# Patient Record
Sex: FEMALE | Race: WHITE | Employment: OTHER | URBAN - METROPOLITAN AREA
[De-identification: names, ages, dates, MRNs, and addresses within clinical notes are randomized per-mention and may not be internally consistent; named-entity substitution may affect disease eponyms.]

---

## 2021-06-10 ENCOUNTER — HOSPITAL ENCOUNTER (OUTPATIENT)
Dept: PREADMISSION TESTING | Age: 76
Discharge: HOME | End: 2021-06-10
Payer: MEDICARE

## 2021-06-10 ENCOUNTER — TRANSCRIBE ORDER (OUTPATIENT)
Dept: REGISTRATION | Age: 76
End: 2021-06-10

## 2021-06-10 DIAGNOSIS — M12.9 ACUTE ARTHROPATHY: ICD-10-CM

## 2021-06-10 DIAGNOSIS — M12.9 ACUTE ARTHROPATHY: Primary | ICD-10-CM

## 2021-06-10 LAB
ANION GAP SERPL CALC-SCNC: 4 MMOL/L (ref 3–18)
ATRIAL RATE: 104 BPM
BASOPHILS # BLD: 0 K/UL (ref 0–0.1)
BASOPHILS NFR BLD: 0 % (ref 0–2)
BUN SERPL-MCNC: 18 MG/DL (ref 7–18)
BUN/CREAT SERPL: 23 (ref 12–20)
CALCIUM SERPL-MCNC: 9.3 MG/DL (ref 8.5–10.1)
CALCULATED P AXIS, ECG09: 69 DEGREES
CALCULATED R AXIS, ECG10: 59 DEGREES
CALCULATED T AXIS, ECG11: 67 DEGREES
CHLORIDE SERPL-SCNC: 108 MMOL/L (ref 100–111)
CO2 SERPL-SCNC: 29 MMOL/L (ref 21–32)
CREAT SERPL-MCNC: 0.77 MG/DL (ref 0.6–1.3)
DIAGNOSIS, 93000: NORMAL
DIFFERENTIAL METHOD BLD: ABNORMAL
EOSINOPHIL # BLD: 0.2 K/UL (ref 0–0.4)
EOSINOPHIL NFR BLD: 4 % (ref 0–5)
ERYTHROCYTE [DISTWIDTH] IN BLOOD BY AUTOMATED COUNT: 14.7 % (ref 11.6–14.5)
GLUCOSE SERPL-MCNC: 104 MG/DL (ref 74–99)
HCT VFR BLD AUTO: 39.3 % (ref 35–45)
HGB BLD-MCNC: 12.8 G/DL (ref 12–16)
LYMPHOCYTES # BLD: 1.4 K/UL (ref 0.9–3.6)
LYMPHOCYTES NFR BLD: 20 % (ref 21–52)
MCH RBC QN AUTO: 30.7 PG (ref 24–34)
MCHC RBC AUTO-ENTMCNC: 32.6 G/DL (ref 31–37)
MCV RBC AUTO: 94.2 FL (ref 74–97)
MONOCYTES # BLD: 0.6 K/UL (ref 0.05–1.2)
MONOCYTES NFR BLD: 9 % (ref 3–10)
NEUTS SEG # BLD: 4.6 K/UL (ref 1.8–8)
NEUTS SEG NFR BLD: 67 % (ref 40–73)
P-R INTERVAL, ECG05: 160 MS
PLATELET # BLD AUTO: 235 K/UL (ref 135–420)
PMV BLD AUTO: 9.3 FL (ref 9.2–11.8)
POTASSIUM SERPL-SCNC: 4 MMOL/L (ref 3.5–5.5)
Q-T INTERVAL, ECG07: 344 MS
QRS DURATION, ECG06: 80 MS
QTC CALCULATION (BEZET), ECG08: 452 MS
RBC # BLD AUTO: 4.17 M/UL (ref 4.2–5.3)
SODIUM SERPL-SCNC: 141 MMOL/L (ref 136–145)
VENTRICULAR RATE, ECG03: 104 BPM
WBC # BLD AUTO: 6.9 K/UL (ref 4.6–13.2)

## 2021-06-10 PROCEDURE — 36415 COLL VENOUS BLD VENIPUNCTURE: CPT

## 2021-06-10 PROCEDURE — 93005 ELECTROCARDIOGRAM TRACING: CPT

## 2021-06-10 PROCEDURE — 80048 BASIC METABOLIC PNL TOTAL CA: CPT

## 2021-06-10 PROCEDURE — 85025 COMPLETE CBC W/AUTO DIFF WBC: CPT

## 2021-06-12 LAB
BACTERIA SPEC CULT: NORMAL
BACTERIA SPEC CULT: NORMAL
SERVICE CMNT-IMP: NORMAL

## 2021-06-21 PROBLEM — M51.26 HNP (HERNIATED NUCLEUS PULPOSUS), LUMBAR: Status: ACTIVE | Noted: 2021-06-21

## 2021-06-21 PROBLEM — M51.36 DDD (DEGENERATIVE DISC DISEASE), LUMBAR: Status: ACTIVE | Noted: 2021-06-21

## 2021-06-21 PROBLEM — M48.061 LUMBAR SPINAL STENOSIS: Status: ACTIVE | Noted: 2021-06-21

## 2021-06-21 RX ORDER — SODIUM CHLORIDE 0.9 % (FLUSH) 0.9 %
5-40 SYRINGE (ML) INJECTION EVERY 8 HOURS
Status: CANCELLED | OUTPATIENT
Start: 2021-06-21

## 2021-06-21 RX ORDER — SODIUM CHLORIDE 0.9 % (FLUSH) 0.9 %
5-40 SYRINGE (ML) INJECTION AS NEEDED
Status: CANCELLED | OUTPATIENT
Start: 2021-06-21

## 2021-06-24 NOTE — H&P
Patient Name:  Demetrius Howell   YOB: 1945    Chief Complaint:  Left-sided hip pain and leg pain. History of Chief Complaint:  Ms. Wayne Palencia is a 64-year-old female who is being seen for left-sided hip pain and leg pain. She has had pain for the past two weeks in the left-sided lower back. She had a fall back in December, but she got better after that. She has had pain in the buttocks and hip. Getting up from a seated position causes pain. Standing for any length of time causes pain. Going up and down stairs causes pain. There is no numbness, no weakness, and no bowel or bladder dysfunction. She has not had this problem before. She has been using Tylenol. She cannot take anti-inflammatory medications. She says the epidural steroid injection seemed to help with her back and left lower extremity pain. She got about 45% improvement for ten days. She is starting to ache all over again. The pain is returning and is almost back to the original intensity. She rates her pain as an 8/10, and it has been for the past two weeks. Past Medical/Surgical History:    Disease/Disorder Date Side Surgery Date Side Comment   GERD         Gout         High cholesterol         Hypertension         Kidney disease         Thyroid disease           Allergies:    Ingredient Reaction Medication Name Comment   SULFA (SULFONAMIDE ANTIBIOTICS)  Codeine        Current Medications:    Medication Directions   allopurinol 100 mg tablet    amlodipine 5 mg tablet    aspirin 81 mg tablet,delayed release    atorvastatin 40 mg tablet    escitalopram 10 mg tablet    ezetimibe 10 mg tablet    levothyroxine 112 mcg tablet    loratadine 10 mg tablet    zolpidem 10 mg tablet      Social History:      SMOKING  Status Tobacco Type Units Per Day Yrs Used   Former smoker Cigarette       ALCOHOL  There is a history of alcohol use. Type: Whiskey. 1 drink consumed rarely.     Family History:    Disease Detail Family Member Age Cause of Death [FreeTextEntry1] : Patient is having migraine equivalents with tingling and mild HA.  \par \par Would increase elavil to 25mg QHS as he tolerates the 10mg dose, and have encouraged him to stay on it for at least two months to know if it is actually helping.   Comments   Cancer Father  N    Heart disease Father  N      Review of Systems:    GENERAL:  Patient has no signs of fever, chills or weight change. HEAD/ENTM:  Patient has no signs of headaches, dizziness, hearing loss, ringing in ears, sore throat, recent cold, double vision, blurred vision, itchy eyes, eye redness or eye discharge. NEUROLOGIC:  Patient has no signs of fainting, muscle weakness, numbness/tingling, loss of balance or seizure disorder. CARDIOVASCULAR:  Patient has no signs of chest pain, palpitations, rheumatic fever or heart murmur. RESPIRATORY:  Patient has no signs of chronic cough, wheezing, difficulty breathing, pain on breathing or shortness of breath. GASTROINTESTINAL:  Patient has no signs of nausea/vomiting, difficulty swallowing, gas/bloating, indigestion, abdominal pain, diarrhea, bloody stools or hemorrhoids. GENITOURINARY:  Patient has no signs of blood in the urine, painful urinating, burning sensation, bladder/kidney infection, frequent urinating or incontinence. MUSCULOSKELETAL:  Patient has no signs of fracture/dislocation, sprain/strain, tendonitis, joint stiffness, joint pain, rheumatoid disease, gout or swelling in feet. INTEGUMENTARY:  Patient has no signs of rash/itching, psoriasis, Raynaud's phenomenon or varicose veins. EMOTIONAL:  Patient has no signs of anxiety, depression, bipolar disorder, memory loss or change in mood. Vitals:  Date BP Pulse Temp (F) Resp. (per min.) Height (Total in.) Weight (lbs.) BMI   02/25/2021     65.00 170.00 28.29     Physical Examination:    General:  The patient appears healthy. Cardiovascular:  Arterial pulses are normal.  Skin:  The skin is normal appearing with no contusions or wounds noted. Heart- RRR  Lungs-CTA jacobo  Abdomen- +BS,soft,nontender  Musculoskeletal:  There is tenderness around the left PSIS and left greater trochanter. The thoracolumbar spine has normal kyphosis, a normal appearance, and no scoliosis.   There is full range of motion of the cervical, thoracic, and lumbar spine and of the hips, knees, and ankles. Straight leg raising and crossed straight leg raising tests are negative. Neurological:  There is no weakness in the thoracic, lumbar, or sacral spine or in the lower extremities or hips. Deep tendon reflexes are present and normal bilaterally. Ankle and knee jerks are normal with no clonus. Radiograph Examination:  AP, lateral, bilateral oblique, flexion and extension views of the lumbar spine were obtained and interpreted in the office 2/25/2021 and reveal 7 mm spondylolisthesis of L5 on S1 with mild degenerative disc disease. An AP view of the pelvis was obtained and interpreted in the office  and reveals no periosteal reaction, no medullary lesions, no osteopenia, well-aligned joint spaces, no chondrolysis, and no fractures. Review of her MRI scan of the lumbar spine  [facility] 4/1/2021 reveals degenerative disc disease, worse at L4-5 and L5-S1, with spondylolisthesis at L5-S1 and foraminal stenosis. Plan: Ms. Ernst Varela and NOEMY had a long discussion about the treatments for her lumbar spinal stenosis and spondylolisthesis including surgical intervention, the risks, and benefits as well as the different surgical approaches and decision making. We also discussed nonsurgical care for this condition including medications, injections, physical therapy, rehabilitation, activity modification, and brace utilization. At this point, she would like to proceed with operative intervention. We will plan for L4 to S1 decompression and fusion as an outpatient. The risks versus the benefits as well as the alternatives were fully explained to the patient.   Risks include, but are not limited to, paralysis, death, heart attack, stroke, pulmonary embolism, deep vein thrombosis, infection, failure to relieve pain, increase in back or leg pain, reherniation of disc material, need for revision surgery, scarring, spinal fluid leak, bowel or bladder dysfunction, disease transmission, chronic graft site pain, instrumentation failure, pseudarthrosis, and the need for chronic ambulatory assist devices. The patient states full understanding of the risks and benefits and wishes to proceed.

## 2021-06-29 ENCOUNTER — ANESTHESIA EVENT (OUTPATIENT)
Dept: SURGERY | Age: 76
DRG: 460 | End: 2021-06-29
Payer: MEDICARE

## 2021-06-30 ENCOUNTER — HOSPITAL ENCOUNTER (INPATIENT)
Age: 76
LOS: 1 days | Discharge: HOME | DRG: 460 | End: 2021-07-01
Attending: ORTHOPAEDIC SURGERY | Admitting: ORTHOPAEDIC SURGERY
Payer: MEDICARE

## 2021-06-30 ENCOUNTER — APPOINTMENT (OUTPATIENT)
Dept: GENERAL RADIOLOGY | Age: 76
DRG: 460 | End: 2021-06-30
Attending: ORTHOPAEDIC SURGERY
Payer: MEDICARE

## 2021-06-30 ENCOUNTER — ANESTHESIA (OUTPATIENT)
Dept: SURGERY | Age: 76
DRG: 460 | End: 2021-06-30
Payer: MEDICARE

## 2021-06-30 DIAGNOSIS — M48.062 SPINAL STENOSIS OF LUMBAR REGION WITH NEUROGENIC CLAUDICATION: Primary | ICD-10-CM

## 2021-06-30 PROBLEM — M48.061 LUMBAR STENOSIS: Status: ACTIVE | Noted: 2021-06-30

## 2021-06-30 LAB
ABO + RH BLD: NORMAL
BLOOD GROUP ANTIBODIES SERPL: NORMAL
SPECIMEN EXP DATE BLD: NORMAL

## 2021-06-30 PROCEDURE — 76060000035 HC ANESTHESIA 2 TO 2.5 HR: Performed by: ORTHOPAEDIC SURGERY

## 2021-06-30 PROCEDURE — 77030013079 HC BLNKT BAIR HGGR 3M -A: Performed by: STUDENT IN AN ORGANIZED HEALTH CARE EDUCATION/TRAINING PROGRAM

## 2021-06-30 PROCEDURE — 76210000016 HC OR PH I REC 1 TO 1.5 HR: Performed by: ORTHOPAEDIC SURGERY

## 2021-06-30 PROCEDURE — C1713 ANCHOR/SCREW BN/BN,TIS/BN: HCPCS | Performed by: ORTHOPAEDIC SURGERY

## 2021-06-30 PROCEDURE — 77030020407 HC IV BLD WRMR ST 3M -A: Performed by: STUDENT IN AN ORGANIZED HEALTH CARE EDUCATION/TRAINING PROGRAM

## 2021-06-30 PROCEDURE — 74011250636 HC RX REV CODE- 250/636: Performed by: REGISTERED NURSE

## 2021-06-30 PROCEDURE — 0SG3071 FUSION OF LUMBOSACRAL JOINT WITH AUTOLOGOUS TISSUE SUBSTITUTE, POSTERIOR APPROACH, POSTERIOR COLUMN, OPEN APPROACH: ICD-10-PCS | Performed by: ORTHOPAEDIC SURGERY

## 2021-06-30 PROCEDURE — 77030019908 HC STETH ESOPH SIMS -A: Performed by: STUDENT IN AN ORGANIZED HEALTH CARE EDUCATION/TRAINING PROGRAM

## 2021-06-30 PROCEDURE — 77030003028 HC SUT VCRL J&J -A: Performed by: ORTHOPAEDIC SURGERY

## 2021-06-30 PROCEDURE — 74011000250 HC RX REV CODE- 250: Performed by: REGISTERED NURSE

## 2021-06-30 PROCEDURE — 74011000250 HC RX REV CODE- 250: Performed by: PHYSICIAN ASSISTANT

## 2021-06-30 PROCEDURE — 74011250636 HC RX REV CODE- 250/636: Performed by: NURSE ANESTHETIST, CERTIFIED REGISTERED

## 2021-06-30 PROCEDURE — 77030020269 HC MISC IMPL: Performed by: ORTHOPAEDIC SURGERY

## 2021-06-30 PROCEDURE — 77030003029 HC SUT VCRL J&J -B: Performed by: ORTHOPAEDIC SURGERY

## 2021-06-30 PROCEDURE — 0SG0071 FUSION OF LUMBAR VERTEBRAL JOINT WITH AUTOLOGOUS TISSUE SUBSTITUTE, POSTERIOR APPROACH, POSTERIOR COLUMN, OPEN APPROACH: ICD-10-PCS | Performed by: ORTHOPAEDIC SURGERY

## 2021-06-30 PROCEDURE — 74011250636 HC RX REV CODE- 250/636: Performed by: STUDENT IN AN ORGANIZED HEALTH CARE EDUCATION/TRAINING PROGRAM

## 2021-06-30 PROCEDURE — 77030004402 HC BUR NEUR STRY -C: Performed by: ORTHOPAEDIC SURGERY

## 2021-06-30 PROCEDURE — 01NB0ZZ RELEASE LUMBAR NERVE, OPEN APPROACH: ICD-10-PCS | Performed by: ORTHOPAEDIC SURGERY

## 2021-06-30 PROCEDURE — 86901 BLOOD TYPING SEROLOGIC RH(D): CPT

## 2021-06-30 PROCEDURE — 74011250637 HC RX REV CODE- 250/637: Performed by: PHYSICIAN ASSISTANT

## 2021-06-30 PROCEDURE — 77030008477 HC STYL SATN SLP COVD -A: Performed by: STUDENT IN AN ORGANIZED HEALTH CARE EDUCATION/TRAINING PROGRAM

## 2021-06-30 PROCEDURE — 77030013708 HC HNDPC SUC IRR PULS STRY –B: Performed by: ORTHOPAEDIC SURGERY

## 2021-06-30 PROCEDURE — 74011250636 HC RX REV CODE- 250/636: Performed by: ORTHOPAEDIC SURGERY

## 2021-06-30 PROCEDURE — 65270000029 HC RM PRIVATE

## 2021-06-30 PROCEDURE — 77030020782 HC GWN BAIR PAWS FLX 3M -B: Performed by: ORTHOPAEDIC SURGERY

## 2021-06-30 PROCEDURE — 77030028271 HC SRGFL HEMSTAT MTRX KT J&J -C: Performed by: ORTHOPAEDIC SURGERY

## 2021-06-30 PROCEDURE — 36415 COLL VENOUS BLD VENIPUNCTURE: CPT

## 2021-06-30 PROCEDURE — 74011250637 HC RX REV CODE- 250/637: Performed by: REGISTERED NURSE

## 2021-06-30 PROCEDURE — 77030008683 HC TU ET CUF COVD -A: Performed by: STUDENT IN AN ORGANIZED HEALTH CARE EDUCATION/TRAINING PROGRAM

## 2021-06-30 PROCEDURE — 74011000250 HC RX REV CODE- 250: Performed by: STUDENT IN AN ORGANIZED HEALTH CARE EDUCATION/TRAINING PROGRAM

## 2021-06-30 PROCEDURE — 77030006643: Performed by: STUDENT IN AN ORGANIZED HEALTH CARE EDUCATION/TRAINING PROGRAM

## 2021-06-30 PROCEDURE — 2709999900 HC NON-CHARGEABLE SUPPLY: Performed by: ORTHOPAEDIC SURGERY

## 2021-06-30 PROCEDURE — 76010000131 HC OR TIME 2 TO 2.5 HR: Performed by: ORTHOPAEDIC SURGERY

## 2021-06-30 PROCEDURE — 74011250636 HC RX REV CODE- 250/636: Performed by: PHYSICIAN ASSISTANT

## 2021-06-30 PROCEDURE — 77030040830 HC CATH URETH FOL MDII -A: Performed by: ORTHOPAEDIC SURGERY

## 2021-06-30 PROCEDURE — 77030008462 HC STPLR SKN PROX J&J -A: Performed by: ORTHOPAEDIC SURGERY

## 2021-06-30 PROCEDURE — 77030038552 HC DRN WND MDII -A: Performed by: ORTHOPAEDIC SURGERY

## 2021-06-30 PROCEDURE — 01NR0ZZ RELEASE SACRAL NERVE, OPEN APPROACH: ICD-10-PCS | Performed by: ORTHOPAEDIC SURGERY

## 2021-06-30 DEVICE — SET SCR SPNL L25MM DIA5.5MM TI FOR 5.5MM ROD EXPEDIUM: Type: IMPLANTABLE DEVICE | Site: SPINE LUMBAR | Status: FUNCTIONAL

## 2021-06-30 DEVICE — ROD SPNL L60MM DIA5.5MM TI PRELORDOSED EXPEDIUM: Type: IMPLANTABLE DEVICE | Site: SPINE LUMBAR | Status: FUNCTIONAL

## 2021-06-30 DEVICE — SCREW SPNL L45MM DIA7MM TI SGL INNR POLYAX FOR 5.5MM ROD: Type: IMPLANTABLE DEVICE | Site: SPINE LUMBAR | Status: FUNCTIONAL

## 2021-06-30 RX ORDER — HEPARIN SODIUM 10000 [USP'U]/ML
INJECTION, SOLUTION INTRAVENOUS; SUBCUTANEOUS AS NEEDED
Status: DISCONTINUED | OUTPATIENT
Start: 2021-06-30 | End: 2021-06-30 | Stop reason: HOSPADM

## 2021-06-30 RX ORDER — FENTANYL CITRATE 50 UG/ML
INJECTION, SOLUTION INTRAMUSCULAR; INTRAVENOUS AS NEEDED
Status: DISCONTINUED | OUTPATIENT
Start: 2021-06-30 | End: 2021-06-30 | Stop reason: HOSPADM

## 2021-06-30 RX ORDER — SODIUM CHLORIDE, SODIUM LACTATE, POTASSIUM CHLORIDE, CALCIUM CHLORIDE 600; 310; 30; 20 MG/100ML; MG/100ML; MG/100ML; MG/100ML
50 INJECTION, SOLUTION INTRAVENOUS CONTINUOUS
Status: DISCONTINUED | OUTPATIENT
Start: 2021-06-30 | End: 2021-06-30 | Stop reason: HOSPADM

## 2021-06-30 RX ORDER — DIPHENHYDRAMINE HYDROCHLORIDE 50 MG/ML
12.5 INJECTION, SOLUTION INTRAMUSCULAR; INTRAVENOUS
Status: DISCONTINUED | OUTPATIENT
Start: 2021-06-30 | End: 2021-06-30 | Stop reason: HOSPADM

## 2021-06-30 RX ORDER — HYDROCODONE BITARTRATE AND ACETAMINOPHEN 7.5; 325 MG/1; MG/1
1 TABLET ORAL
Status: DISCONTINUED | OUTPATIENT
Start: 2021-06-30 | End: 2021-07-01 | Stop reason: HOSPADM

## 2021-06-30 RX ORDER — ONDANSETRON 2 MG/ML
INJECTION INTRAMUSCULAR; INTRAVENOUS AS NEEDED
Status: DISCONTINUED | OUTPATIENT
Start: 2021-06-30 | End: 2021-06-30 | Stop reason: HOSPADM

## 2021-06-30 RX ORDER — HYDROCODONE BITARTRATE AND ACETAMINOPHEN 5; 325 MG/1; MG/1
1 TABLET ORAL
Status: DISCONTINUED | OUTPATIENT
Start: 2021-06-30 | End: 2021-07-01 | Stop reason: HOSPADM

## 2021-06-30 RX ORDER — MIDAZOLAM HYDROCHLORIDE 1 MG/ML
INJECTION, SOLUTION INTRAMUSCULAR; INTRAVENOUS AS NEEDED
Status: DISCONTINUED | OUTPATIENT
Start: 2021-06-30 | End: 2021-06-30 | Stop reason: HOSPADM

## 2021-06-30 RX ORDER — NALOXONE HYDROCHLORIDE 0.4 MG/ML
0.1 INJECTION, SOLUTION INTRAMUSCULAR; INTRAVENOUS; SUBCUTANEOUS AS NEEDED
Status: DISCONTINUED | OUTPATIENT
Start: 2021-06-30 | End: 2021-07-01 | Stop reason: HOSPADM

## 2021-06-30 RX ORDER — LEVOTHYROXINE SODIUM 75 UG/1
112 TABLET ORAL
Status: DISCONTINUED | OUTPATIENT
Start: 2021-07-01 | End: 2021-07-01 | Stop reason: HOSPADM

## 2021-06-30 RX ORDER — METAXALONE 800 MG/1
800 TABLET ORAL
Status: DISCONTINUED | OUTPATIENT
Start: 2021-06-30 | End: 2021-07-01 | Stop reason: HOSPADM

## 2021-06-30 RX ORDER — NALOXONE HYDROCHLORIDE 0.4 MG/ML
0.04 INJECTION, SOLUTION INTRAMUSCULAR; INTRAVENOUS; SUBCUTANEOUS
Status: DISCONTINUED | OUTPATIENT
Start: 2021-06-30 | End: 2021-06-30 | Stop reason: HOSPADM

## 2021-06-30 RX ORDER — ACETAMINOPHEN 500 MG
1000 TABLET ORAL
COMMUNITY
End: 2021-07-01

## 2021-06-30 RX ORDER — SODIUM CHLORIDE 0.9 % (FLUSH) 0.9 %
5-40 SYRINGE (ML) INJECTION EVERY 8 HOURS
Status: DISCONTINUED | OUTPATIENT
Start: 2021-06-30 | End: 2021-06-30 | Stop reason: HOSPADM

## 2021-06-30 RX ORDER — SODIUM CHLORIDE 0.9 % (FLUSH) 0.9 %
5-40 SYRINGE (ML) INJECTION AS NEEDED
Status: DISCONTINUED | OUTPATIENT
Start: 2021-06-30 | End: 2021-06-30 | Stop reason: HOSPADM

## 2021-06-30 RX ORDER — SODIUM CHLORIDE 0.9 % (FLUSH) 0.9 %
5-40 SYRINGE (ML) INJECTION AS NEEDED
Status: DISCONTINUED | OUTPATIENT
Start: 2021-06-30 | End: 2021-07-01 | Stop reason: HOSPADM

## 2021-06-30 RX ORDER — PHENYLEPHRINE HCL IN 0.9% NACL 1 MG/10 ML
SYRINGE (ML) INTRAVENOUS AS NEEDED
Status: DISCONTINUED | OUTPATIENT
Start: 2021-06-30 | End: 2021-06-30 | Stop reason: HOSPADM

## 2021-06-30 RX ORDER — NALBUPHINE HYDROCHLORIDE 10 MG/ML
5 INJECTION, SOLUTION INTRAMUSCULAR; INTRAVENOUS; SUBCUTANEOUS
Status: DISCONTINUED | OUTPATIENT
Start: 2021-06-30 | End: 2021-06-30 | Stop reason: HOSPADM

## 2021-06-30 RX ORDER — EZETIMIBE 10 MG/1
10 TABLET ORAL
Status: DISCONTINUED | OUTPATIENT
Start: 2021-06-30 | End: 2021-07-01 | Stop reason: HOSPADM

## 2021-06-30 RX ORDER — SODIUM CHLORIDE, SODIUM LACTATE, POTASSIUM CHLORIDE, CALCIUM CHLORIDE 600; 310; 30; 20 MG/100ML; MG/100ML; MG/100ML; MG/100ML
125 INJECTION, SOLUTION INTRAVENOUS CONTINUOUS
Status: DISCONTINUED | OUTPATIENT
Start: 2021-06-30 | End: 2021-07-01 | Stop reason: HOSPADM

## 2021-06-30 RX ORDER — DIPHENHYDRAMINE HYDROCHLORIDE 50 MG/ML
12.5 INJECTION, SOLUTION INTRAMUSCULAR; INTRAVENOUS
Status: DISCONTINUED | OUTPATIENT
Start: 2021-06-30 | End: 2021-07-01 | Stop reason: HOSPADM

## 2021-06-30 RX ORDER — GLYCOPYRROLATE 0.2 MG/ML
INJECTION INTRAMUSCULAR; INTRAVENOUS AS NEEDED
Status: DISCONTINUED | OUTPATIENT
Start: 2021-06-30 | End: 2021-06-30 | Stop reason: HOSPADM

## 2021-06-30 RX ORDER — CEFAZOLIN SODIUM/WATER 2 G/20 ML
2 SYRINGE (ML) INTRAVENOUS ONCE
Status: COMPLETED | OUTPATIENT
Start: 2021-06-30 | End: 2021-06-30

## 2021-06-30 RX ORDER — DOCUSATE SODIUM 100 MG/1
100 CAPSULE, LIQUID FILLED ORAL 2 TIMES DAILY
Status: DISCONTINUED | OUTPATIENT
Start: 2021-06-30 | End: 2021-07-01 | Stop reason: HOSPADM

## 2021-06-30 RX ORDER — SODIUM CHLORIDE 0.9 % (FLUSH) 0.9 %
5-40 SYRINGE (ML) INJECTION EVERY 8 HOURS
Status: DISCONTINUED | OUTPATIENT
Start: 2021-06-30 | End: 2021-07-01 | Stop reason: HOSPADM

## 2021-06-30 RX ORDER — SODIUM CHLORIDE, SODIUM LACTATE, POTASSIUM CHLORIDE, CALCIUM CHLORIDE 600; 310; 30; 20 MG/100ML; MG/100ML; MG/100ML; MG/100ML
125 INJECTION, SOLUTION INTRAVENOUS CONTINUOUS
Status: DISCONTINUED | OUTPATIENT
Start: 2021-06-30 | End: 2021-07-01 | Stop reason: SDUPTHER

## 2021-06-30 RX ORDER — FENTANYL CITRATE 50 UG/ML
50 INJECTION, SOLUTION INTRAMUSCULAR; INTRAVENOUS
Status: DISCONTINUED | OUTPATIENT
Start: 2021-06-30 | End: 2021-06-30 | Stop reason: HOSPADM

## 2021-06-30 RX ORDER — CEFAZOLIN SODIUM/WATER 2 G/20 ML
2 SYRINGE (ML) INTRAVENOUS EVERY 8 HOURS
Status: COMPLETED | OUTPATIENT
Start: 2021-06-30 | End: 2021-07-01

## 2021-06-30 RX ORDER — PROPOFOL 10 MG/ML
INJECTION, EMULSION INTRAVENOUS AS NEEDED
Status: DISCONTINUED | OUTPATIENT
Start: 2021-06-30 | End: 2021-06-30 | Stop reason: HOSPADM

## 2021-06-30 RX ORDER — LIDOCAINE HYDROCHLORIDE 20 MG/ML
INJECTION, SOLUTION EPIDURAL; INFILTRATION; INTRACAUDAL; PERINEURAL AS NEEDED
Status: DISCONTINUED | OUTPATIENT
Start: 2021-06-30 | End: 2021-06-30 | Stop reason: HOSPADM

## 2021-06-30 RX ORDER — ROCURONIUM BROMIDE 10 MG/ML
INJECTION, SOLUTION INTRAVENOUS AS NEEDED
Status: DISCONTINUED | OUTPATIENT
Start: 2021-06-30 | End: 2021-06-30 | Stop reason: HOSPADM

## 2021-06-30 RX ORDER — HYDROMORPHONE HCL IN WATER/PF 1 MG/ML
SYRINGE (ML) INJECTION
Status: DISCONTINUED | OUTPATIENT
Start: 2021-06-30 | End: 2021-07-01

## 2021-06-30 RX ORDER — ACETAMINOPHEN 325 MG/1
650 TABLET ORAL
Status: DISCONTINUED | OUTPATIENT
Start: 2021-06-30 | End: 2021-07-01 | Stop reason: HOSPADM

## 2021-06-30 RX ORDER — HYDROMORPHONE HYDROCHLORIDE 1 MG/ML
0.5 INJECTION, SOLUTION INTRAMUSCULAR; INTRAVENOUS; SUBCUTANEOUS AS NEEDED
Status: DISCONTINUED | OUTPATIENT
Start: 2021-06-30 | End: 2021-06-30 | Stop reason: HOSPADM

## 2021-06-30 RX ORDER — AMLODIPINE BESYLATE 5 MG/1
5 TABLET ORAL DAILY
Status: DISCONTINUED | OUTPATIENT
Start: 2021-07-01 | End: 2021-07-01

## 2021-06-30 RX ORDER — KETOROLAC TROMETHAMINE 30 MG/ML
30 INJECTION, SOLUTION INTRAMUSCULAR; INTRAVENOUS ONCE
Status: DISCONTINUED | OUTPATIENT
Start: 2021-06-30 | End: 2021-06-30 | Stop reason: HOSPADM

## 2021-06-30 RX ORDER — KETAMINE HCL IN 0.9 % NACL 50 MG/5 ML
SYRINGE (ML) INTRAVENOUS AS NEEDED
Status: DISCONTINUED | OUTPATIENT
Start: 2021-06-30 | End: 2021-06-30 | Stop reason: HOSPADM

## 2021-06-30 RX ORDER — ONDANSETRON 4 MG/1
4 TABLET, ORALLY DISINTEGRATING ORAL
Status: DISCONTINUED | OUTPATIENT
Start: 2021-06-30 | End: 2021-07-01

## 2021-06-30 RX ORDER — TRANEXAMIC ACID 10 MG/ML
1000 INJECTION, SOLUTION INTRAVENOUS ONCE
Status: COMPLETED | OUTPATIENT
Start: 2021-06-30 | End: 2021-06-30

## 2021-06-30 RX ADMIN — LIGHT MINERAL OIL, WHITE PETROLATUM 0.2 ML: 150; 850 OINTMENT OPHTHALMIC at 14:37

## 2021-06-30 RX ADMIN — SODIUM CHLORIDE, SODIUM LACTATE, POTASSIUM CHLORIDE, AND CALCIUM CHLORIDE 125 ML/HR: 600; 310; 30; 20 INJECTION, SOLUTION INTRAVENOUS at 10:00

## 2021-06-30 RX ADMIN — SODIUM CHLORIDE, SODIUM LACTATE, POTASSIUM CHLORIDE, AND CALCIUM CHLORIDE 125 ML/HR: 600; 310; 30; 20 INJECTION, SOLUTION INTRAVENOUS at 18:37

## 2021-06-30 RX ADMIN — ONDANSETRON 4 MG: 4 TABLET, ORALLY DISINTEGRATING ORAL at 19:20

## 2021-06-30 RX ADMIN — Medication 30 MG: at 14:33

## 2021-06-30 RX ADMIN — FENTANYL CITRATE 100 MCG: 50 INJECTION, SOLUTION INTRAMUSCULAR; INTRAVENOUS at 14:32

## 2021-06-30 RX ADMIN — Medication: at 17:03

## 2021-06-30 RX ADMIN — FENTANYL CITRATE 50 MCG: 50 INJECTION INTRAMUSCULAR; INTRAVENOUS at 17:17

## 2021-06-30 RX ADMIN — ROCURONIUM BROMIDE 10 MG: 10 INJECTION, SOLUTION INTRAVENOUS at 15:16

## 2021-06-30 RX ADMIN — FENTANYL CITRATE 50 MCG: 50 INJECTION INTRAMUSCULAR; INTRAVENOUS at 17:00

## 2021-06-30 RX ADMIN — SUGAMMADEX 200 MG: 100 INJECTION, SOLUTION INTRAVENOUS at 16:23

## 2021-06-30 RX ADMIN — GLYCOPYRROLATE 0.2 MG: 0.2 INJECTION INTRAMUSCULAR; INTRAVENOUS at 14:32

## 2021-06-30 RX ADMIN — Medication 100 MCG: at 15:02

## 2021-06-30 RX ADMIN — Medication 10 ML: at 22:00

## 2021-06-30 RX ADMIN — Medication 100 MCG: at 14:52

## 2021-06-30 RX ADMIN — ONDANSETRON HYDROCHLORIDE 4 MG: 2 INJECTION INTRAMUSCULAR; INTRAVENOUS at 14:33

## 2021-06-30 RX ADMIN — FENTANYL CITRATE 50 MCG: 50 INJECTION INTRAMUSCULAR; INTRAVENOUS at 16:47

## 2021-06-30 RX ADMIN — ROCURONIUM BROMIDE 50 MG: 10 INJECTION, SOLUTION INTRAVENOUS at 14:35

## 2021-06-30 RX ADMIN — FENTANYL CITRATE 50 MCG: 50 INJECTION INTRAMUSCULAR; INTRAVENOUS at 17:35

## 2021-06-30 RX ADMIN — Medication 100 MCG: at 14:53

## 2021-06-30 RX ADMIN — CEFAZOLIN SODIUM 2 G: 100 INJECTION, POWDER, LYOPHILIZED, FOR SOLUTION INTRAVENOUS at 23:54

## 2021-06-30 RX ADMIN — Medication 100 MCG: at 15:07

## 2021-06-30 RX ADMIN — LIDOCAINE HYDROCHLORIDE 100 MG: 20 INJECTION, SOLUTION INTRAVENOUS at 14:34

## 2021-06-30 RX ADMIN — ONDANSETRON 4 MG: 4 TABLET, ORALLY DISINTEGRATING ORAL at 23:53

## 2021-06-30 RX ADMIN — SODIUM CHLORIDE, SODIUM LACTATE, POTASSIUM CHLORIDE, AND CALCIUM CHLORIDE: 600; 310; 30; 20 INJECTION, SOLUTION INTRAVENOUS at 14:28

## 2021-06-30 RX ADMIN — TRANEXAMIC ACID 1000 MG: 10 INJECTION, SOLUTION INTRAVENOUS at 14:46

## 2021-06-30 RX ADMIN — CEFAZOLIN 2 G: 1 INJECTION, POWDER, FOR SOLUTION INTRAVENOUS at 14:40

## 2021-06-30 RX ADMIN — PROPOFOL 200 MG: 10 INJECTION, EMULSION INTRAVENOUS at 14:34

## 2021-06-30 RX ADMIN — MIDAZOLAM 2 MG: 1 INJECTION INTRAMUSCULAR; INTRAVENOUS at 14:31

## 2021-06-30 NOTE — ANESTHESIA POSTPROCEDURE EVALUATION
Procedure(s):  LUMBAR 4- SACRAL 1 DECOMPRESSION AND FUSION WITH C-ARM (DEPUY). general    Anesthesia Post Evaluation        Comments: Post-Anesthesia Evaluation and Assessment    Cardiovascular Function/Vital Signs  BP (!) 139/52   Pulse 96   Temp 37.3 °C (99.1 °F)   Resp 14   Ht 5' 3\" (1.6 m)   Wt 77.4 kg (170 lb 11.2 oz)   SpO2 98%   BMI 30.24 kg/m²     Patient is status post Procedure(s):  LUMBAR 4- SACRAL 1 DECOMPRESSION AND FUSION WITH C-ARM (DEPUY). Nausea/Vomiting: Controlled. Postoperative hydration reviewed and adequate. Pain:  Pain Scale 1: FLACC (06/30/21 1728)  Pain Intensity 1: 0 (06/30/21 1728)   Managed. Neurological Status:   Neuro (WDL): Exceptions to WDL (06/30/21 1634)   At baseline. Mental Status and Level of Consciousness: Arousable. Pulmonary Status:   O2 Device: 02 face tent (06/30/21 1634)   Adequate oxygenation and airway patent. Complications related to anesthesia: None    Post-anesthesia assessment completed. No concerns. Patient has met all discharge requirements. Signed By: Leilani Velazco MD    June 30, 2021                   INITIAL Post-op Vital signs:   Vitals Value Taken Time   /52 06/30/21 1740   Temp 37.3 °C (99.1 °F) 06/30/21 1634   Pulse 103 06/30/21 1744   Resp 16 06/30/21 1744   SpO2 100 % 06/30/21 1744   Vitals shown include unvalidated device data.

## 2021-06-30 NOTE — PERIOP NOTES
TRANSFER - OUT REPORT:    Verbal report given to Micki ESTRELLA (name) on Misty Glez  being transferred to 2 S (unit) for routine progression of care       Report consisted of patients Situation, Background, Assessment and   Recommendations(SBAR). Information from the following report(s) OR Summary, Procedure Summary, Intake/Output and MAR was reviewed with the receiving nurse. Lines:   Peripheral IV 06/30/21 Left; Outer Hand (Active)   Site Assessment Clean, dry, & intact 06/30/21 1634   Phlebitis Assessment 0 06/30/21 1634   Infiltration Assessment 0 06/30/21 1634   Dressing Status Clean, dry, & intact 06/30/21 1634   Dressing Type Transparent;Tape 06/30/21 1634   Hub Color/Line Status Infusing 06/30/21 1634   Alcohol Cap Used No 06/30/21 1000        Opportunity for questions and clarification was provided.       Patient transported with:   O2 @ 2 liters  Registered Nurse

## 2021-06-30 NOTE — INTERVAL H&P NOTE
Update History & Physical    The Patient's History and Physical was reviewed with the patient and I examined the patient. There was no change. The surgical site was confirmed by the patient and me. Plan:  The risk, benefits, expected outcome, and alternative to the recommended procedure have been discussed with the patient. Patient understands and wants to proceed with the procedure.     Electronically signed by Andrea Tan MD on 6/30/2021 at 12:41 PM

## 2021-06-30 NOTE — ANESTHESIA PREPROCEDURE EVALUATION
Relevant Problems   No relevant active problems       Anesthetic History   No history of anesthetic complications     Pertinent negatives: No PONV       Review of Systems / Medical History  Patient summary reviewed, nursing notes reviewed and pertinent labs reviewed    Pulmonary  Within defined limits            Pertinent negatives: No COPD, asthma and sleep apnea     Neuro/Psych   Within defined limits        Pertinent negatives: No seizures and CVA   Cardiovascular    Hypertension            Pertinent negatives: No past MI and CHF  Exercise tolerance: >4 METS     GI/Hepatic/Renal  Within defined limits           Pertinent negatives: No liver disease and renal disease   Endo/Other      Hypothyroidism  Arthritis  Pertinent negatives: No diabetes   Other Findings              Physical Exam    Airway  Mallampati: I  TM Distance: 4 - 6 cm  Neck ROM: normal range of motion   Mouth opening: Normal     Cardiovascular    Rhythm: regular  Rate: normal         Dental  No notable dental hx       Pulmonary  Breath sounds clear to auscultation               Abdominal  GI exam deferred       Other Findings            Anesthetic Plan    ASA: 2  Anesthesia type: general          Induction: Intravenous  Anesthetic plan and risks discussed with: Patient

## 2021-06-30 NOTE — ROUTINE PROCESS
Bedside and Verbal shift change report given to Alberta rn (oncoming nurse) by Elly Sanchez RN (offgoing nurse). Report included the following information SBAR, Kardex, MAR and Recent Results.

## 2021-06-30 NOTE — PROGRESS NOTES
Discussed with Mr Emil Bailey that his wife is having enough discomfort and will need a PT assessment prior to discharge.   Given her age and medical condition, best to keep her overnight and discharge when cleared by PT.

## 2021-06-30 NOTE — ROUTINE PROCESS
TRANSFER - IN REPORT:    Verbal report received from ESTEFANIA Inman rn(name) on Ricki Manners  being received from PACU(unit) for routine post - op      Report consisted of patients Situation, Background, Assessment and   Recommendations(SBAR). Information from the following report(s) SBAR, Kardex, [facility] and Recent Results was reviewed with the receiving nurse. Opportunity for questions and clarification was provided. Assessment completed upon patients arrival to unit and care assumed.

## 2021-06-30 NOTE — PERIOP NOTES
Dr Frantz Ma aware of patient's  concerns regarding patient being admitted to 2 S and he will follow up. Patient called office and stated that her providers office instructed her to continue with her pain medication and Valium for her pains. She states, \" I am hurting all over\" \" My arm is hurting and my back, my ribs.\"   She states she is in a lot of pain and no one is listening. Informed patient to try and follow her providers recommendations of heat/ice and medications and if symptoms worsen then she should seek further medical care.   Patient states she is worried that she is not moving much due to the pain and soreness from her fall and her INR on Monday was low. She is worried about a blood clot. Patient currently denied any symptoms of blood clot. Informed patient to take an increase dose of warfarin today from 7.5mg to 10mg and make sure she keeps her INR appointment for tomorrow. Patient ok with plan and will do the dosing change. Offerred support to patient as she is in pain and feels no one is helping her. Informed patient her provider and writer are trying to assist her, due to a fall she will be sore and stiff but again reinforced if symptoms worsen she should seek medical care. Patient verbalizes understanding and thanked writer for listening.    Note forwarded to pcp office, Dr. Gaines. For his review

## 2021-06-30 NOTE — PERIOP NOTES
Attempted to inform Mr. Susanne Lamar about wife going to room 211, no answer on the phone provided.

## 2021-06-30 NOTE — PERIOP NOTES
Reviewed PTA medication list with patient/caregiver and patient/caregiver denies any additional medications. Patient admits to having a responsible adult care for them at home for at least 24 hours after surgery. Patient encouraged to use gown warming system and informed that using said warming gown to regulate body temperature prior to a procedure has been shown to help reduce the risks of blood clots and infection. Patient's pharmacy of choice verified and documented in PTA medication section. Dual skin assessment & fall risk band verification completed with Beatrice Vidales RN.

## 2021-06-30 NOTE — PROGRESS NOTES
[facility] care of pt at this time. Assessment complete. Pt alert and oriented x 4. Shows no sign of distress. Fall risk arm band in place. Denies SOB and chest pain. Pt lungs clear bilaterally. Cap refill  less than 3 seconds. Pt denies numbness and tingling to all extremities. Stated pain 6/10. Pt has 18 G IV to L hand. . scds and TEDs applied to BLE. Incentive spirometer at bedside. Pt encouraged to continue use of IS. Pt verbalized understanding. Ice pack applied. Call light and possessions within reach. Bed locked and in low position. Will continue to monitor.

## 2021-06-30 NOTE — OP NOTES
Patient: Arsalan Gonzalez MRN: [id number]  SSN: [id number]    YOB: 1945  Age: 66 y.o. Sex: female        Date of Procedure: 6/30/2021   Preoperative Diagnosis: FACET ARTHROSOS, LUMBAR HNP WITH RADICULOPATHY, LUMBOSACRAL  HNP WITH RADICULOPATHY, LUMBAGO, LUMBOSACRAL SPONDYLOLISTHESIS, LUMBAR STENOSIS, HYPERTENSION  Postoperative Diagnosis: FACET ARTHROSOS, LUMBAR HNP WITH RADICULOPATHY, LUMBOSACRAL  HNP WITH RADICULOPATHY, LUMBAGO, LUMBOSACRAL SPONDYLOLISTHESIS, LUMBAR STENOSIS, HYPERTENSION    Procedure: Procedure(s):  LUMBAR 4- SACRAL 1 DECOMPRESSION AND FUSION WITH C-ARM ([employer])  Surgeon(s) and Role:     * Monie Ross MD - Primary  Assistant: Marques Johnson PA-C  OR Assitance: Physician Assistant: NOVA Ashley  Surg Asst-1: Lelia Moser  Anesthesia: General   Estimated Blood Loss: 100cc  Fluids: 1000cc   Specimens: * No specimens in log *   Findings: same  Complications: none  Implants:   Implant Name Type Inv. Item Serial No.  Lot No. LRB No. Used Action   [employer] FIBERGRFT BG PUTTY     PROSIDYAN_WD [id number] N/A 1 Implanted   SET SCR SPNL L25MM DIA5. 5MM TI FOR 5.5MM EDDIE EXPEDIUM - [id number]  SET SCR SPNL L25MM DIA5. 5MM TI FOR 5.5MM EDDIE EXPEDIUM  [employer] DEPUY SYNTHES SPINE_WD [id number] N/A 6 Implanted   EDDIE SPNL L60MM DIA5. 5MM TI Alissa Counter - [id number]  EDDIE SPNL L60MM DIA5. 5MM TI PRELORDOSED EXPEDIUM  [employer]J DEPUY SYNTHES SPINE_WD [id number] N/A 2 Implanted   SCREW SPNL L45MM DIA7MM TI SGL INNR POLYAX FOR 5.5MM EDDIE - [id number]  SCREW SPNL L45MM DIA7MM TI SGL INNR POLYAX FOR 5.5MM EDDIE  [employer]J DEPUY SYNTHES SPINE_WD [id number] N/A 6 Implanted         Indications: This is a 66y.o. year-old female who presents with significant back   and bilateral lower extremity pain, worsening ability to do activities of daily living. X-rays and MRI scan revealing severe spinal stenosis, degenerative disk disease and disk herniation.  Having failed conservative care, he comes for operative intervention. DESCRIPTION OF PROCEDURE: After correct identification, the patient was   taken to the operating room, placed supine on the table, general   endotracheal anesthesia induced. 2grams of Ancef was given. Patient was then rotated prone onto the [facility] table. Lumbar spine was prepped and draped in the usual sterile fashion. Midline incision was made in the lumbar spine, taken down to the spinous processes of L4-S1. The posterior transverse processes bilaterally were expose at L4-S1 as seen on intraoperative fluoroscopy. Gelpi retractors were then placed. The wound was then irrigated. Complete wide laminectomy was performed at L5 as well as the inferior   half of L4 bilaterally and the superior half of S1 bilaterally. Removal of more than 1/2 of the medial facets bilaterally allowed excellent midline decompression. Foraminotomies which included undercutting the pars intra-articularis were then performed bilaterally at L4-S1 until a Penfield 3 was easily passed through each of the neural foramen. This allowed visualization of the L4, L5 and S1 nerve roots. The wound   was then irrigated. Bur was then used to open the posterior aspect of the   pedicles bilaterally at L4-S1. A gearshift probe was then placed through   each of these posterior bur holes, through the pedicle, into vertebral body   under intraoperative fluoroscopy. Ball-tipped probe was then placed through   each gearshift tracts, ensuring the gearshift had only gone through the bone. Pedicle screws from [employer] spinal system were then placed bilaterally at L4-S1. Rods were then fixed to the pedicle screws using the locking caps. Each of these caps were then torqued into position. Intraoperative x-ray revealed excellent alignment of the  hardware and anatomy. Wound was then irrigated with 1000cc of pulse lavage irrigation.  Bur was then used to open the posterior aspect of the facet joints and transverse process bilaterally as well as removing of the cartilage surface of the facet joints. Bone graft that was taken from the laminectomy site was then placed in the   posterolateral gutters as well as in facet joints. Drain was then placed. Fascia was then closed using #1 Vicryl suture. Subcutaneous tissue   approximated with 2-0 Vicryl suture. Skin approximated with staples. Sterile dressing was applied. The patient tolerated the procedure well and taken to Recovery room in good   condition. Assistant surgeon was needed throughout the procedure for managing bleeding, improving visualization and closure of the surgical wounds.

## 2021-07-01 VITALS
WEIGHT: 170.7 LBS | SYSTOLIC BLOOD PRESSURE: 148 MMHG | DIASTOLIC BLOOD PRESSURE: 70 MMHG | OXYGEN SATURATION: 99 % | BODY MASS INDEX: 30.25 KG/M2 | RESPIRATION RATE: 18 BRPM | TEMPERATURE: 98.8 F | HEIGHT: 63 IN | HEART RATE: 102 BPM

## 2021-07-01 PROBLEM — M48.061 LUMBAR STENOSIS: Status: RESOLVED | Noted: 2021-06-30 | Resolved: 2021-07-01

## 2021-07-01 PROBLEM — M48.061 SPINAL STENOSIS, LUMBAR: Status: ACTIVE | Noted: 2021-07-01

## 2021-07-01 PROBLEM — M51.26 HNP (HERNIATED NUCLEUS PULPOSUS), LUMBAR: Status: RESOLVED | Noted: 2021-06-21 | Resolved: 2021-07-01

## 2021-07-01 PROBLEM — M48.061 LUMBAR SPINAL STENOSIS: Status: RESOLVED | Noted: 2021-06-21 | Resolved: 2021-07-01

## 2021-07-01 LAB
ERYTHROCYTE [DISTWIDTH] IN BLOOD BY AUTOMATED COUNT: 13.6 % (ref 11.6–14.5)
HCT VFR BLD AUTO: 36.8 % (ref 35–45)
HGB BLD-MCNC: 11.7 G/DL (ref 12–16)
MCH RBC QN AUTO: 29.8 PG (ref 24–34)
MCHC RBC AUTO-ENTMCNC: 31.8 G/DL (ref 31–37)
MCV RBC AUTO: 93.6 FL (ref 74–97)
PLATELET # BLD AUTO: 211 K/UL (ref 135–420)
PMV BLD AUTO: 10.1 FL (ref 9.2–11.8)
RBC # BLD AUTO: 3.93 M/UL (ref 4.2–5.3)
WBC # BLD AUTO: 10.9 K/UL (ref 4.6–13.2)

## 2021-07-01 PROCEDURE — 97165 OT EVAL LOW COMPLEX 30 MIN: CPT

## 2021-07-01 PROCEDURE — 74011250637 HC RX REV CODE- 250/637: Performed by: PHYSICIAN ASSISTANT

## 2021-07-01 PROCEDURE — 74011000250 HC RX REV CODE- 250: Performed by: PHYSICIAN ASSISTANT

## 2021-07-01 PROCEDURE — 77030027138 HC INCENT SPIROMETER -A

## 2021-07-01 PROCEDURE — 85027 COMPLETE CBC AUTOMATED: CPT

## 2021-07-01 PROCEDURE — 36415 COLL VENOUS BLD VENIPUNCTURE: CPT

## 2021-07-01 PROCEDURE — 97535 SELF CARE MNGMENT TRAINING: CPT

## 2021-07-01 PROCEDURE — 74011250636 HC RX REV CODE- 250/636: Performed by: PHYSICIAN ASSISTANT

## 2021-07-01 PROCEDURE — 97161 PT EVAL LOW COMPLEX 20 MIN: CPT

## 2021-07-01 RX ORDER — METAXALONE 800 MG/1
800 TABLET ORAL
Qty: 60 TABLET | Refills: 0 | Status: SHIPPED | OUTPATIENT
Start: 2021-07-01

## 2021-07-01 RX ORDER — AMLODIPINE BESYLATE 5 MG/1
5 TABLET ORAL DAILY
Status: DISCONTINUED | OUTPATIENT
Start: 2021-07-01 | End: 2021-07-01 | Stop reason: HOSPADM

## 2021-07-01 RX ORDER — PROMETHAZINE HYDROCHLORIDE 12.5 MG/1
12.5 TABLET ORAL
Qty: 20 TABLET | Refills: 0 | Status: SHIPPED | OUTPATIENT
Start: 2021-07-01

## 2021-07-01 RX ORDER — NALOXONE HYDROCHLORIDE 4 MG/.1ML
SPRAY NASAL
Qty: 1 EACH | Refills: 0 | Status: SHIPPED | OUTPATIENT
Start: 2021-07-01

## 2021-07-01 RX ORDER — LUBIPROSTONE 24 UG/1
24 CAPSULE, GELATIN COATED ORAL 2 TIMES DAILY WITH MEALS
Qty: 20 CAPSULE | Refills: 0 | Status: SHIPPED | OUTPATIENT
Start: 2021-07-01 | End: 2021-07-11

## 2021-07-01 RX ORDER — ONDANSETRON 4 MG/1
8 TABLET, ORALLY DISINTEGRATING ORAL
Status: DISCONTINUED | OUTPATIENT
Start: 2021-07-01 | End: 2021-07-01 | Stop reason: HOSPADM

## 2021-07-01 RX ORDER — HYDROCODONE BITARTRATE AND ACETAMINOPHEN 5; 325 MG/1; MG/1
1-2 TABLET ORAL
Qty: 84 TABLET | Refills: 0 | Status: SHIPPED | OUTPATIENT
Start: 2021-07-01 | End: 2021-07-11

## 2021-07-01 RX ORDER — PROMETHAZINE HYDROCHLORIDE 25 MG/1
25 SUPPOSITORY RECTAL ONCE
Status: COMPLETED | OUTPATIENT
Start: 2021-07-01 | End: 2021-07-01

## 2021-07-01 RX ADMIN — ONDANSETRON 4 MG: 4 TABLET, ORALLY DISINTEGRATING ORAL at 05:45

## 2021-07-01 RX ADMIN — Medication 10 ML: at 13:51

## 2021-07-01 RX ADMIN — Medication 10 ML: at 05:46

## 2021-07-01 RX ADMIN — HYDROCODONE BITARTRATE AND ACETAMINOPHEN 1 TABLET: 5; 325 TABLET ORAL at 05:50

## 2021-07-01 RX ADMIN — CEFAZOLIN SODIUM 2 G: 100 INJECTION, POWDER, LYOPHILIZED, FOR SOLUTION INTRAVENOUS at 13:50

## 2021-07-01 RX ADMIN — PROMETHAZINE HYDROCHLORIDE 25 MG: 25 SUPPOSITORY RECTAL at 10:28

## 2021-07-01 RX ADMIN — AMLODIPINE BESYLATE 5 MG: 5 TABLET ORAL at 07:22

## 2021-07-01 RX ADMIN — CEFAZOLIN SODIUM 2 G: 100 INJECTION, POWDER, LYOPHILIZED, FOR SOLUTION INTRAVENOUS at 05:45

## 2021-07-01 RX ADMIN — LEVOTHYROXINE SODIUM 112 MCG: 0.07 TABLET ORAL at 07:22

## 2021-07-01 RX ADMIN — HYDROCODONE BITARTRATE AND ACETAMINOPHEN 1 TABLET: 7.5; 325 TABLET ORAL at 13:54

## 2021-07-01 NOTE — ROUTINE PROCESS
Bedside and Verbal shift change report given to PEPITO De La Garza RN (oncoming nurse) by COLLIN Moore RN (offgoing nurse). Report included the following information SBAR, Kardex, Intake/Output and MAR.

## 2021-07-01 NOTE — PROGRESS NOTES
7/1/2021 PT note: consult received and chart reviewed. Spoke with nurse Shahram d/t pt tachy all morning. Nurse reports pt vomiting and just received suppository to address same; rec hold PT for now. Will f/u at later time as pt schedule allows for PT evaluation.  Thank you for this referral.   Ricardo Mcclellan, PT

## 2021-07-01 NOTE — NURSE NAVIGATOR
Hildred Part post spine rounding. Patient educated: Activity:  OOB for all meals, walk every hour to prevent blood clots  Follow back precautions (no bending, twisting, lifting &  Log roll in/out of bed). If your surgeon wants you to wear a back brace. Wear it per your surgeon's instructions. Promoting Circulation:   Use SCD pumps except when walking. Ankle pumps 10 times an hour at hospital & home. Pain Control:  Pain medications side effects discussed. Use ice, distraction, moving, & change position to also help with pain. Narcotics and anesthesia cause constipation so it is important to take stool softener/mild laxative daily while on narcotics. Incentive Spirometry:    Use of incentive spirometer 10 x/hr. Diet:   Eat for healing. Drink enough fluids so urine is the color of lemonade. Medication causes nausea and dizziness if taken on an empty stomach so instructed to make sure to eat a snack before taking any medicaitons. Patient Safety:   Call light & belongings in reach. Call for help when want to walk or get OOB.     Mobility Intervention:       [] Pt dangled at edge of bed    [] Pt assisted OOB to bedside commode    [x] Pt assisted OOB to chair    [x] Pt ambulated to bathroom    [] Patient was ambulated in room/hallway    Assistive Device Utilized:       [x] Rolling walker   [] Crutches   [] Straight Cane   [x] Back brace   [] IV pole    After Rounding and Checking on Patient     [x] Patient left in no apparent distress sitting up in chair  [] Patient left in no apparent distress in bed  [x] Call bell left within reach  [] SCDs on both legs & machine turned on  [x] Ice applied  [] RN notified  [x]  present  [] Bed alarm activated    Reason patient not mobilized:      [] Patient refused   [] Nausea/vomiting   [] Low blood pressure   [] Drowsy/lethargic    Pain Rating:     [] 1  [] 6  [] 2  [] 7  [] 3  [] 8  [] 4  [] 9  [] 5  [] 10    Left patient with call light, cell phone and personal belongings in reach for safety. Hamzah Moctezuma verbalized understand. Given the opportunity for asking questions.

## 2021-07-01 NOTE — ROUTINE PROCESS
Bedside and Verbal shift change report given to Brayan RN (oncoming nurse) by Adilene George RN (offgoing nurse). Report included the following information SBAR, Kardex, Intake/Output and MAR.

## 2021-07-01 NOTE — PROGRESS NOTES
Progress Note POD #1      Patient: Shivani Ortiz               Sex: female          DOA: 6/30/2021         YOB: 1945      Surgery: Procedure(s):  LUMBAR 4- SACRAL 1 DECOMPRESSION AND FUSION WITH C-ARM (DEPUY)           LOS: 0 days               Subjective:      C/ O nausea. Denies leg pain. Objective:      Visit Vitals  BP (!) 147/63   Pulse (!) 111   Temp 99.3 °F (37.4 °C)   Resp 18   Ht 5' 3\" (1.6 m)   Wt 77.4 kg (170 lb 11.2 oz)   SpO2 93%   BMI 30.24 kg/m²       Physical Exam:  Neurological: motor strength: 5/5 in lower extremities bilaterally                          sensation: intact to light touch  Patient mobility                         Intake and Output:  Current Shift:  No intake/output data recorded. Last three shifts:  06/29 1901 - 07/01 0700  In: 1500 [I.V.:1500]  Out: 2400 [Urine:1850; Drains:150]    Lab/Data Reviewed:    Lab/Data Reviewed:  Lab Results   Component Value Date/Time    WBC 10.9 07/01/2021 03:30 AM    HGB 11.7 (L) 07/01/2021 03:30 AM    HCT 36.8 07/01/2021 03:30 AM    PLATELET 667 31/49/8208 03:30 AM    MCV 93.6 07/01/2021 03:30 AM     No results found for: APTT  No results found for: INR, PTMR, PTP, PT1, PT2, INREXT         Assessment/Plan     Principal Problem:    Lumbar spinal stenosis (6/21/2021)    Active Problems:    DDD (degenerative disc disease), lumbar (6/21/2021)      HNP (herniated nucleus pulposus), lumbar (6/21/2021)      Lumbar stenosis (6/30/2021)        1. Stable  2. OOB with PT  3. D/C Planning  4. D/C PCA  5. D/C best  6. D/C drain & change dressing prior to discharge home. 7.Discharge to home after being cleared by P.T  8. Follow-up in 10 days at [facility] with Dr. Lucy Keys. 9. Phenergan iv prn nausea. 10. HTN & mild tachycardia- will give her home med  Norvasc now.

## 2021-07-01 NOTE — PROGRESS NOTES
1445-Hemovac drain removed, small square mepilex applied over site, optifoam applied over incision. 50 mL removed from hemovac.     1452-Discharge instructions reviewed with patient at this time. Opportunity for questions and clarification was provided. Patient has verbalized understanding. Patient was provided with care notes to include side effects of RX's. Arm bands removed and shredded. AVS reviewed with Tasia JENNINGS RN. IV removed. Dressing CDI.

## 2021-07-01 NOTE — PROGRESS NOTES
0724   Pt is awake, alert, oriented x4. Resting in bed. Still has slight nausea. /63 and  early this morning. NOVA Esposito aware and in to see pt. Took amlodipine dose this morning. 0845   Pt ambulated to BR to void then back in bed.  0943   Pt sitting on chair. Pt eating breakfast.  Pt again vomited 300 ml of greenish fluids. Lungs are clear. Abdomen soft. obese with hypoactive BS.   1014   NOVA Esquivel was paged regarding pt's request for Rectal Supp for nausea. NOVA Frazire answered for pt. Ordered Phenergan 25 mg Supp CO x1.   1345   Pt ambulated with PT in hallway and stairs. Pt was cleared  By PT for D/c.  1356   Pt medicated with Norco 7.5/325 mg po for pain level 7/10.  1550  Pt d/sean per wheelchair accompanied by  . Carolyn Linares

## 2021-07-01 NOTE — DISCHARGE INSTRUCTIONS
DAVID De La Torre ANYONE WHO SMOKES. AVOID ALL PRODUCTS THAT CONTAIN NICOTINE. DO NOT TAKE IBUPROFEN OR ANTI--INFLAMMATORIES, AS THESE MAY ALTER THE HEALING OF THE FUSION. ACTIVITIES :  *The first week after surgery   1. You may be up and walking about the house. 2.  Activities around the house, such as washing dishes, fixing light meals, and your own personal care are fine. 3.  Avoid strenuous activities, such as vacuuming, lifting laundry or grocery bags. 4.  Do not lift anything heavier than 1 gallon of milk (or about 5-8 pounds). 5.  Do not bend over to  items from the ground level until 3 months post-op. *Week 2 and beyond  1. You may gradually increase your activities, but still avoid heavy lifting, pushing/pulling. 2.  Walking is the best way to rebuild strength and stamina. Start SLOWLY and gradually increase the distance a little every week. 3.  Walk at a pace that avoids fatigue or severe pain. Do not try to walk several blocks the first day! As you increase the distance, you may feel tired. If so, stop and rest.   4.  You should be able to walk several blocks by your first clinic visit. 5.  Follow-up with Dr. Musa Vela in 10-14 days. BATHING and INCISION CARE:  1. The incision may be tender to touch or feel numb: this is normal.   2.  Keep the incision clean and dry. Do not get incision wet for 5 days. The incision will be closed with sutures under the skin and the skin will be glued. 3.  Do not apply any lotions, ointments or oils on the incision. 4.  If you notice any excessive swelling, redness, or persistent drainage around the incision, notify the office immediately. DRIVIN. You should not drive until after your follow-up appointment. 2.  You can be in a vehicle for short distances, but if you travel any long distance, please stop about every 30 minutes and walk/stretch. 3.  You should NEVER drive while taking narcotic medication. RETURN TO WORK :  1. The decision to return to work will be determined on an individual basis. 2.  Many people who have a strenuous job (construction, heavy labor, etc) may need to be off work for up to 12 weeks. 3.  If you need a work note, please let us know as soon as possible, and not the same day you are planning to return to work. NUTRITION :  1.  Good nutrition is an essential part of healing. 2.  You should eat a balanced diet each day, including fruits, vegetables, dairy products and protein. 3.  Remember to drink plenty of water. 4.  If you have not had a bowel movement within 3 days of surgery, you will need to use a laxative or suppository that can be obtained over-the-counter at your local pharmacy. BONE STIMULATOR:  1. A spinal bone stimulator may be prescribed for you under certain situations. 2.  A nurse will call you if one has been requested and discuss its use for approximately 4-6 months post-op every day. 3.  This device assists in bone healing and fusion. MEDICATIONS -  1. You may resume the medications you were taking before surgery, with the general exception of (or DO NOT TAKE) non-steroidal anti-inflammatory medications, such as Motrin, Aleve, Advil Naprosyn, Ibuprofen or aspirin. 2.  You will receive a prescription for pain medication at discharge from the hospital. The pain medication works best if taken before the pain becomes severe. 3.  To reduce stomach upset, always take the medication with food. 4.  Begin to wean yourself off the pain medication during the second week after discharge. 5.  If you need a refill, please call the office during working hours at least 2 days before your prescription runs out. Do not wait until your bottle is empty to call for a refill. 6.  DO NOT drive if you are taking narcotic pain medications.     HOME HEALTH CARE:  1.   A home health care service has been set-up for you to help assist you once you leave the hospital.  2.  They will contact you either before you leave the hospital or within 24 hours once you have been discharged home. 3. A nurse will assist you with your dressing changes and a Physical Therapist with help you with your therapy needs. CALL THE OFFICE:   If you have severe pain unrelieved by the medications, new numbness or tingling in your legs;   If you have a fever of 101.0°F or greater;    If you notice excessive swelling, redness, or persistent drainage from the incision or IV site; [facility] office number is [phone] from 8:00am to 5:00pm Monday through Friday. After 5:00pm, on weekends, or holidays, please leave a message with our answering service and the doctor on-call will get back to you shortly.

## 2021-07-01 NOTE — PROGRESS NOTES
1920 - Patient in bed at this time. A/O x 4. IV to left hand  intact and patent. SCDs and TEDs to bilateral LE. ABD pad dressing to back CDI. No c/o numbness/tingling. Pedal and radial pulses palpable. Lungs CTA. Bowel sounds active to all quadrants. Pain 5/10. PCA pump in place and functioning. Zofran given for nausea. Hemovac in place draining sanguinous drainage. Mattson in place draining clear, yellow urine without difficulty. 2328-emesis clear- 100 ml out. 2353-Zofran given. 0258-Emesis clear-300 ml out. Zofran given for nausea. 0308-PCA pump stopped. 0555-Mattson catheter removed. Shift summary-Terrie BHATT informed of patient's nausea and emesis during the noc. She will look into a solution.

## 2021-07-01 NOTE — PROGRESS NOTES
Problem: Mobility Impaired (Adult and Pediatric)  Goal: *Acute Goals and Plan of Care (Insert Text)  Description: Physical Therapy Goals  Initiated 7/1/2021  to be met within 1-2 days  Bed mobility:  Log rolling side to side and to sit in prep for OOB transfers with S/SBA. Transfers:  Sit to/from stand with S/SBA in prep for gait. Standing/Ambulation Balance:  Good with RW for safe transfers and gait. Ambulation:  Ambulate 150ft with S/SBA with RW for home/community mobility. Patient Education:  Patient/family independent/SBA with don/doffing LSO for safe performance at discharge. Stairs: Ascend/Descend 3-5 steps with CGA/HR for ability to navigate stairs as needed. Outcome: Progressing Towards Goal              [x]  Patient has met MD mobilization critieria for d/c home   [x]  Recommend HH with 24 hour adult care   []  Benefit from additional acute PT session to address:      PHYSICAL THERAPY EVALUATION    Patient: Elvira Juarez (46 y.o. female)  Date: 7/1/2021  Primary Diagnosis: Lumbar stenosis [M48.061]  Spinal stenosis, lumbar [M48.061]  Procedure(s) (LRB):  LUMBAR 4- SACRAL 1 DECOMPRESSION AND FUSION WITH C-ARM (DEPUY) (N/A) 1 Day Post-Op   Precautions:   Fall, Back, Other (comment) (LSO)  PLOF: independent PTA    ASSESSMENT :  Based on the objective data described below, the patient presents with decreased independence in functional mobility with regard to bed mobility, transfers, and gait following surgery as above. Pt found up in chair and eager to participate with PT evaluation. Pt educated in back precautions as appropriate, log rolling and donning/doffing/use of LSO as appropriate. LSO removed and pt able to readjust for fit. Pt demonstrates transfers with RW/S and able to participation in GT/RW in anne with S >150ft. Flor decreased; reciprocal gt pattern. Pt instructed in step nego and completed same as noted below with single and bilat HR's, single step pattern.   Pt left back in room with all needs in reach, spouse present and nurse pt notified pt ready to progress to next level of care with HHPT. Recommend HHPT upon discharge to continue rehab and reach maximal level of functional mobility independence and safety. Patient will benefit from skilled intervention to address the above impairments. Patient's rehabilitation potential is considered to be Good  Factors which may influence rehabilitation potential include:   []         None noted  []         Mental ability/status  []         Medical condition  []         Home/family situation and support systems  []         Safety awareness  [x]         Pain tolerance/management  []         Other:      PLAN :  Recommendations and Planned Interventions:  [x]           Bed Mobility Training             []    Neuromuscular Re-Education  [x]           Transfer Training                   []    Orthotic/Prosthetic Training  [x]           Gait Training                          []    Modalities  [x]           Therapeutic Exercises           []    Edema Management/Control  [x]           Therapeutic Activities            []    Family Training/Education  [x]           Patient Education  []           Other (comment):    Frequency/Duration: Patient will be followed by physical therapy 1-2 times per day/4-7 days per week to address goals. Discharge Recommendations: Home Physical Therapy  Further Equipment Recommendations for Discharge: N/A     SUBJECTIVE:   Patient stated I haven't had any pain medicine since five am.  No c/o nausea.     OBJECTIVE DATA SUMMARY:     Past Medical History:   Diagnosis Date    Allergies     Gout     High cholesterol     Hypertension before 2011    Rheumatic fever 2000    no problems since    Thyroid disease     hypo     Past Surgical History:   Procedure Laterality Date    HX CATARACT REMOVAL Bilateral     2014    HX GI  11/2020    colonoscopy     Barriers to Learning/Limitations: yes;  physical  Compensate with: Visual Cues, Verbal Cues, and Tactile Cues  Home Situation:  Home Situation  Home Environment: Private residence  # Steps to Enter: 4  Rails to Enter: Yes  Hand Rails : Bilateral  Wheelchair Ramp: No  One/Two Story Residence: One story  Living Alone: No  Support Systems: Spouse/Significant Other/Partner  Patient Expects to be Discharged to[de-identified] [employer]  Current DME Used/Available at Home: Walker, rolling, Shower chair, Commode, bedside  Tub or Shower Type: Shower  Critical Behavior:  Neurologic State: Alert; Appropriate for age  Orientation Level: Oriented X4  Cognition: Appropriate decision making; Appropriate for age attention/concentration; Appropriate safety awareness; Follows commands  Safety/Judgement: Awareness of environment; Fall prevention  Psychosocial  Patient Behaviors: Calm; Cooperative  Family  Behaviors: Calm;Supportive  Family  Behaviors: Calm;Supportive  Strength:    Strength: Generally decreased, functional  Tone & Sensation:   Sensation: Intact  Range Of Motion:  AROM: Generally decreased, functional  Functional Mobility:  Bed Mobility: (per OT report as pt declines performance at this time)  Rolling: Supervision  Supine to Sit: Supervision;Stand-by assistance  Sit to Supine: Supervision  Scooting: Stand-by assistance  Transfers:  Sit to Stand: Supervision  Stand to Sit: Supervision  Balance:   Sitting: Intact  Standing: Intact; With support  Ambulation/Gait Training:  Distance (ft): 180 Feet (ft)  Assistive Device: Brace/Splint;Gait belt;Walker, rolling  Ambulation - Level of Assistance: Supervision  Gait Description (WDL): Exceptions to WDL  Gait Abnormalities: Antalgic  Speed/Flor: Pace decreased (<100 feet/min)  Interventions: Safety awareness training;Verbal cues; Visual/Demos  Stairs:  Number of Stairs Trained: 5  Stairs - Level of Assistance: Contact guard assistance  Rail Use: Both (and L ascending/R descending)  Pain:  Pain level pre-treatment: 7/10   Pain level post-treatment: 7/10   Pain Intervention(s) : Medication (see MAR); Rest, Ice, Repositioning  Response to intervention: Nurse notified, See doc flow  Activity Tolerance:   Good   Please refer to the flowsheet for vital signs taken during this treatment. After treatment:   [x]         Patient left in no apparent distress sitting up in chair  []         Patient left in no apparent distress in bed  [x]         Call bell left within reach  [x]         Nursing notified  [x]         Caregiver present  []         Bed alarm activated  []         SCDs applied    COMMUNICATION/EDUCATION:   [x]         Role of Physical Therapy in the acute care setting. [x]         Fall prevention education was provided and the patient/caregiver indicated understanding. [x]         Patient/family have participated as able in goal setting and plan of care. [x]         Patient/family agree to work toward stated goals and plan of care. []         Patient understands intent and goals of therapy, but is neutral about his/her participation. []         Patient is unable to participate in goal setting/plan of care: ongoing with therapy staff.  []         Other:     Thank you for this referral.  Flaco Samuels, PT   Time Calculation: 18 mins      Eval Complexity: History: HIGH Complexity :3+ comorbidities / personal factors will impact the outcome/ POC Exam:LOW Complexity : 1-2 Standardized tests and measures addressing body structure, function, activity limitation and / or participation in recreation  Presentation: LOW Complexity : Stable, uncomplicated  Clinical Decision Making:Low Complexity  Overall omplexity:LOW

## 2022-03-18 PROBLEM — M51.369 DDD (DEGENERATIVE DISC DISEASE), LUMBAR: Status: ACTIVE | Noted: 2021-06-21

## 2022-03-19 PROBLEM — M48.061 SPINAL STENOSIS, LUMBAR: Status: ACTIVE | Noted: 2021-07-01

## 2025-01-23 ENCOUNTER — APPOINTMENT (OUTPATIENT)
Facility: HOSPITAL | Age: 80
End: 2025-01-23
Payer: MEDICARE

## 2025-01-23 ENCOUNTER — HOSPITAL ENCOUNTER (EMERGENCY)
Facility: HOSPITAL | Age: 80
Discharge: HOME | End: 2025-01-23
Attending: EMERGENCY MEDICINE
Payer: MEDICARE

## 2025-01-23 VITALS
WEIGHT: 160 LBS | TEMPERATURE: 99.1 F | HEART RATE: 116 BPM | HEIGHT: 63 IN | DIASTOLIC BLOOD PRESSURE: 67 MMHG | BODY MASS INDEX: 28.35 KG/M2 | SYSTOLIC BLOOD PRESSURE: 166 MMHG | OXYGEN SATURATION: 92 % | RESPIRATION RATE: 20 BRPM

## 2025-01-23 DIAGNOSIS — E86.0 DEHYDRATION: ICD-10-CM

## 2025-01-23 DIAGNOSIS — J18.9 PNEUMONIA OF RIGHT LOWER LOBE DUE TO INFECTIOUS ORGANISM: Primary | ICD-10-CM

## 2025-01-23 LAB
ALBUMIN SERPL-MCNC: 2.8 G/DL (ref 3.4–5)
ALBUMIN/GLOB SERPL: 0.7 (ref 0.8–1.7)
ALP SERPL-CCNC: 171 U/L (ref 45–117)
ALT SERPL-CCNC: 31 U/L (ref 13–56)
AMORPH CRY URNS QL MICRO: ABNORMAL
ANION GAP SERPL CALC-SCNC: 6 MMOL/L (ref 3–18)
APPEARANCE UR: ABNORMAL
AST SERPL-CCNC: 78 U/L (ref 10–38)
BACTERIA URNS QL MICRO: ABNORMAL /HPF
BASOPHILS # BLD: 0.02 K/UL (ref 0–0.1)
BASOPHILS NFR BLD: 0.2 % (ref 0–2)
BILIRUB SERPL-MCNC: 0.7 MG/DL (ref 0.2–1)
BILIRUB UR QL: ABNORMAL
BUN SERPL-MCNC: 34 MG/DL (ref 7–18)
BUN/CREAT SERPL: 24 (ref 12–20)
CALCIUM SERPL-MCNC: 8.7 MG/DL (ref 8.5–10.1)
CHLORIDE SERPL-SCNC: 102 MMOL/L (ref 100–111)
CO2 SERPL-SCNC: 27 MMOL/L (ref 21–32)
COLOR UR: ABNORMAL
CREAT SERPL-MCNC: 1.4 MG/DL (ref 0.6–1.3)
DIFFERENTIAL METHOD BLD: ABNORMAL
EKG ATRIAL RATE: 112 BPM
EKG DIAGNOSIS: NORMAL
EKG P AXIS: 49 DEGREES
EKG P-R INTERVAL: 142 MS
EKG Q-T INTERVAL: 320 MS
EKG QRS DURATION: 82 MS
EKG QTC CALCULATION (BAZETT): 436 MS
EKG R AXIS: 12 DEGREES
EKG T AXIS: 20 DEGREES
EKG VENTRICULAR RATE: 112 BPM
EOSINOPHIL # BLD: 0.01 K/UL (ref 0–0.4)
EOSINOPHIL NFR BLD: 0.1 % (ref 0–5)
EPITH CASTS URNS QL MICRO: ABNORMAL /LPF (ref 0–5)
ERYTHROCYTE [DISTWIDTH] IN BLOOD BY AUTOMATED COUNT: 14.6 % (ref 11.6–14.5)
FLUAV RNA SPEC QL NAA+PROBE: NOT DETECTED
FLUBV RNA SPEC QL NAA+PROBE: NOT DETECTED
GLOBULIN SER CALC-MCNC: 4.2 G/DL (ref 2–4)
GLUCOSE SERPL-MCNC: 130 MG/DL (ref 74–99)
GLUCOSE UR STRIP.AUTO-MCNC: NEGATIVE MG/DL
GRAN CASTS URNS QL MICRO: ABNORMAL /LPF
HCT VFR BLD AUTO: 33 % (ref 35–45)
HGB BLD-MCNC: 11 G/DL (ref 12–16)
HGB UR QL STRIP: ABNORMAL
HYALINE CASTS URNS QL MICRO: ABNORMAL /LPF (ref 0–2)
IMM GRANULOCYTES # BLD AUTO: 0.12 K/UL (ref 0–0.04)
IMM GRANULOCYTES NFR BLD AUTO: 1.2 % (ref 0–0.5)
KETONES UR QL STRIP.AUTO: ABNORMAL MG/DL
LEUKOCYTE ESTERASE UR QL STRIP.AUTO: NEGATIVE
LIPASE SERPL-CCNC: 41 U/L (ref 13–75)
LYMPHOCYTES # BLD: 0.39 K/UL (ref 0.9–3.3)
LYMPHOCYTES NFR BLD: 3.8 % (ref 21–52)
MAGNESIUM SERPL-MCNC: 2.1 MG/DL (ref 1.6–2.6)
MCH RBC QN AUTO: 29.6 PG (ref 24–34)
MCHC RBC AUTO-ENTMCNC: 33.3 G/DL (ref 31–37)
MCV RBC AUTO: 88.9 FL (ref 78–100)
MONOCYTES # BLD: 0.56 K/UL (ref 0.05–1.2)
MONOCYTES NFR BLD: 5.4 % (ref 3–10)
MUCOUS THREADS URNS QL MICRO: ABNORMAL /LPF
NEUTS SEG # BLD: 9.3 K/UL (ref 1.8–8)
NEUTS SEG NFR BLD: 89.3 % (ref 40–73)
NITRITE UR QL STRIP.AUTO: NEGATIVE
NRBC # BLD: 0 K/UL (ref 0–0.01)
NRBC BLD-RTO: 0 PER 100 WBC
PH UR STRIP: 5.5 (ref 5–8)
PLATELET # BLD AUTO: 219 K/UL (ref 135–420)
PMV BLD AUTO: 10 FL (ref 9.2–11.8)
POTASSIUM SERPL-SCNC: 3.7 MMOL/L (ref 3.5–5.5)
PROT SERPL-MCNC: 7 G/DL (ref 6.4–8.2)
PROT UR STRIP-MCNC: 100 MG/DL
RBC # BLD AUTO: 3.71 M/UL (ref 4.2–5.3)
RBC #/AREA URNS HPF: ABNORMAL /HPF (ref 0–5)
SARS-COV-2 RNA RESP QL NAA+PROBE: NOT DETECTED
SODIUM SERPL-SCNC: 135 MMOL/L (ref 136–145)
SOURCE: NORMAL
SP GR UR REFRACTOMETRY: 1.03 (ref 1–1.03)
TROPONIN I SERPL HS-MCNC: 79 NG/L (ref 0–54)
UROBILINOGEN UR QL STRIP.AUTO: 1 EU/DL (ref 0.2–1)
WBC # BLD AUTO: 10.4 K/UL (ref 4.6–13.2)
WBC URNS QL MICRO: ABNORMAL /HPF (ref 0–5)

## 2025-01-23 PROCEDURE — 80053 COMPREHEN METABOLIC PANEL: CPT

## 2025-01-23 PROCEDURE — 81001 URINALYSIS AUTO W/SCOPE: CPT

## 2025-01-23 PROCEDURE — 86674 GIARDIA LAMBLIA ANTIBODY: CPT

## 2025-01-23 PROCEDURE — 96361 HYDRATE IV INFUSION ADD-ON: CPT

## 2025-01-23 PROCEDURE — 84484 ASSAY OF TROPONIN QUANT: CPT

## 2025-01-23 PROCEDURE — 87506 IADNA-DNA/RNA PROBE TQ 6-11: CPT

## 2025-01-23 PROCEDURE — 93005 ELECTROCARDIOGRAM TRACING: CPT | Performed by: EMERGENCY MEDICINE

## 2025-01-23 PROCEDURE — 2580000003 HC RX 258: Performed by: EMERGENCY MEDICINE

## 2025-01-23 PROCEDURE — 6360000002 HC RX W HCPCS: Performed by: EMERGENCY MEDICINE

## 2025-01-23 PROCEDURE — 85025 COMPLETE CBC W/AUTO DIFF WBC: CPT

## 2025-01-23 PROCEDURE — 83690 ASSAY OF LIPASE: CPT

## 2025-01-23 PROCEDURE — 2500000003 HC RX 250 WO HCPCS: Performed by: EMERGENCY MEDICINE

## 2025-01-23 PROCEDURE — 87207 SMEAR SPECIAL STAIN: CPT

## 2025-01-23 PROCEDURE — 96374 THER/PROPH/DIAG INJ IV PUSH: CPT

## 2025-01-23 PROCEDURE — 6360000004 HC RX CONTRAST MEDICATION: Performed by: EMERGENCY MEDICINE

## 2025-01-23 PROCEDURE — 6370000000 HC RX 637 (ALT 250 FOR IP): Performed by: EMERGENCY MEDICINE

## 2025-01-23 PROCEDURE — 83735 ASSAY OF MAGNESIUM: CPT

## 2025-01-23 PROCEDURE — 74177 CT ABD & PELVIS W/CONTRAST: CPT

## 2025-01-23 PROCEDURE — 99285 EMERGENCY DEPT VISIT HI MDM: CPT

## 2025-01-23 PROCEDURE — 71045 X-RAY EXAM CHEST 1 VIEW: CPT

## 2025-01-23 PROCEDURE — 87636 SARSCOV2 & INF A&B AMP PRB: CPT

## 2025-01-23 RX ORDER — BENZONATATE 100 MG/1
100 CAPSULE ORAL
Status: COMPLETED | OUTPATIENT
Start: 2025-01-23 | End: 2025-01-23

## 2025-01-23 RX ORDER — 0.9 % SODIUM CHLORIDE 0.9 %
1000 INTRAVENOUS SOLUTION INTRAVENOUS ONCE
Status: COMPLETED | OUTPATIENT
Start: 2025-01-23 | End: 2025-01-23

## 2025-01-23 RX ORDER — PROMETHAZINE HYDROCHLORIDE 25 MG/1
25 SUPPOSITORY RECTAL EVERY 6 HOURS PRN
Qty: 12 SUPPOSITORY | Refills: 0 | Status: SHIPPED | OUTPATIENT
Start: 2025-01-23 | End: 2025-01-30

## 2025-01-23 RX ORDER — IOPAMIDOL 612 MG/ML
100 INJECTION, SOLUTION INTRAVASCULAR
Status: COMPLETED | OUTPATIENT
Start: 2025-01-23 | End: 2025-01-23

## 2025-01-23 RX ORDER — AZITHROMYCIN 500 MG/1
500 TABLET, FILM COATED ORAL DAILY
Qty: 3 TABLET | Refills: 0 | Status: SHIPPED | OUTPATIENT
Start: 2025-01-23 | End: 2025-01-26

## 2025-01-23 RX ORDER — 0.9 % SODIUM CHLORIDE 0.9 %
500 INTRAVENOUS SOLUTION INTRAVENOUS ONCE
Status: COMPLETED | OUTPATIENT
Start: 2025-01-23 | End: 2025-01-23

## 2025-01-23 RX ORDER — BENZONATATE 100 MG/1
100 CAPSULE ORAL 3 TIMES DAILY PRN
Qty: 30 CAPSULE | Refills: 0 | Status: SHIPPED | OUTPATIENT
Start: 2025-01-23 | End: 2025-02-02

## 2025-01-23 RX ORDER — AZITHROMYCIN 250 MG/1
250 TABLET, FILM COATED ORAL
Status: COMPLETED | OUTPATIENT
Start: 2025-01-23 | End: 2025-01-23

## 2025-01-23 RX ADMIN — WATER 1000 MG: 1 INJECTION INTRAMUSCULAR; INTRAVENOUS; SUBCUTANEOUS at 14:18

## 2025-01-23 RX ADMIN — BENZONATATE 100 MG: 100 CAPSULE ORAL at 10:49

## 2025-01-23 RX ADMIN — AZITHROMYCIN DIHYDRATE 250 MG: 250 TABLET ORAL at 14:18

## 2025-01-23 RX ADMIN — SODIUM CHLORIDE 1000 ML: 9 INJECTION, SOLUTION INTRAVENOUS at 10:46

## 2025-01-23 RX ADMIN — IOPAMIDOL 100 ML: 612 INJECTION, SOLUTION INTRAVENOUS at 12:16

## 2025-01-23 RX ADMIN — SODIUM CHLORIDE 500 ML: 9 INJECTION, SOLUTION INTRAVENOUS at 14:18

## 2025-01-23 ASSESSMENT — LIFESTYLE VARIABLES
HOW MANY STANDARD DRINKS CONTAINING ALCOHOL DO YOU HAVE ON A TYPICAL DAY: 1 OR 2
HOW OFTEN DO YOU HAVE A DRINK CONTAINING ALCOHOL: MONTHLY OR LESS

## 2025-01-23 ASSESSMENT — PAIN - FUNCTIONAL ASSESSMENT: PAIN_FUNCTIONAL_ASSESSMENT: NONE - DENIES PAIN

## 2025-01-23 NOTE — ED TRIAGE NOTES
GI distress x4 weeks, 2 weeks in [place] (got back on 1/19), nausea vomiting diarrhea during the 4 weeks, seen at PCP 2 days ago, PCP ordered labs and possible hydration if pt could not hydrate with PO fluids, pt not tolerating PO well, barely eating.

## 2025-01-23 NOTE — ED PROVIDER NOTES
EMERGENCY DEPARTMENT HISTORY AND PHYSICAL EXAM    Date: 1/23/2025  Patient Name: Sara Valero    History of Presenting Illness     No chief complaint on file.        History Provided By: Patient    Additional History (Context):   8:54 AM YENY Valero is a 79 y.o. female with PMHX of hypertension, high cholesterol, thyroid disease, lumbar degenerative disc disease with spinal stenosis who presents to the emergency department C/O fevers and illness.  Patient and his wife just returned from a trip in [place].  She started developing symptoms of malaise fatigue is having waxing and waning fevers.  She states she has had nausea vomiting diarrhea back-and-forth over the last several days.  She is able to hydrate but not as well as she should.  Urinary output is diminishing.    Social History  No smoking drinking or drugs.    Family History  No significant history    PCP: Chari Evans, DO    No current facility-administered medications for this encounter.     Current Outpatient Medications   Medication Sig Dispense Refill    azithromycin (ZITHROMAX) 500 MG tablet Take 1 tablet by mouth daily for 3 days 3 tablet 0    benzonatate (TESSALON) 100 MG capsule Take 1 capsule by mouth 3 times daily as needed for Cough 30 capsule 0    promethazine (PROMETHEGAN) 25 MG suppository Place 1 suppository rectally every 6 hours as needed for Nausea 12 suppository 0    amLODIPine (NORVASC) 5 MG tablet Take 5 mg by mouth daily      atorvastatin (LIPITOR) 40 MG tablet Take 40 mg by mouth      vitamin D (CHOLECALCIFEROL) 25 MCG (1000 UT) TABS tablet Take 1,000 Units by mouth daily      ezetimibe (ZETIA) 10 MG tablet Take 10 mg by mouth      levothyroxine (SYNTHROID) 112 MCG tablet Take 112 mcg by mouth every morning (before breakfast)      loratadine (CLARITIN) 10 MG tablet Take 10 mg by mouth daily      metaxalone (SKELAXIN) 800 MG tablet Take 800 mg by mouth 3 times daily as needed      naloxone 4 MG/0.1ML LIQD nasal spray Use 1  formulated and outlined with them.  I have encouraged them to ask questions as they arise throughout their visit.         Diagnosis and Disposition       DISCHARGE NOTE:  2:15 PM  Sara Valero's  results have been reviewed with her.  She has been counseled regarding her diagnosis, treatment, and plan.  She verbally conveys understanding and agreement of the signs, symptoms, diagnosis, treatment and prognosis and additionally agrees to follow up as discussed.  She also agrees with the care-plan and conveys that all of her questions have been answered.  I have also provided discharge instructions for her that include: educational information regarding their diagnosis and treatment, and list of reasons why they would want to return to the ED prior to their follow-up appointment, should her condition change. She has been provided with education for proper emergency department utilization.     CLINICAL IMPRESSION:    1. Pneumonia of right lower lobe due to infectious organism    2. Dehydration        PLAN:  1. D/C Home  2.      Medication List        START taking these medications      azithromycin 500 MG tablet  Commonly known as: ZITHROMAX  Take 1 tablet by mouth daily for 3 days     benzonatate 100 MG capsule  Commonly known as: TESSALON  Take 1 capsule by mouth 3 times daily as needed for Cough     promethazine 25 MG suppository  Commonly known as: Promethegan  Place 1 suppository rectally every 6 hours as needed for Nausea  Replaces: promethazine 12.5 MG tablet            STOP taking these medications      promethazine 12.5 MG tablet  Commonly known as: PHENERGAN  Replaced by: promethazine 25 MG suppository            ASK your doctor about these medications      amLODIPine 5 MG tablet  Commonly known as: NORVASC     atorvastatin 40 MG tablet  Commonly known as: LIPITOR     ezetimibe 10 MG tablet  Commonly known as: ZETIA     levothyroxine 112 MCG tablet  Commonly known as: SYNTHROID     loratadine 10 MG

## 2025-01-24 LAB
C COLI+JEJUNI TUF STL QL NAA+PROBE: NEGATIVE
EC STX1+STX2 GENES STL QL NAA+PROBE: NEGATIVE
ETEC ELTA+ESTB GENES STL QL NAA+PROBE: NEGATIVE
P SHIGELLOIDES DNA STL QL NAA+PROBE: NEGATIVE
SALMONELLA SP SPAO STL QL NAA+PROBE: NEGATIVE
SHIGELLA SP+EIEC IPAH STL QL NAA+PROBE: NEGATIVE
V CHOL+PARA+VUL DNA STL QL NAA+NON-PROBE: NEGATIVE
Y ENTEROCOL DNA STL QL NAA+NON-PROBE: NEGATIVE

## 2025-01-28 LAB — PARASITE BLD: NORMAL

## 2025-01-29 LAB — Lab: 15 U/ML

## (undated) DEVICE — SOL IRRIGATION INJ NACL 0.9% 500ML BTL

## (undated) DEVICE — SUTURE VCRL + SZ 2-0 L18IN ABSRB VLT CT-2 1/2 CIR TAPERCUT VCP726D

## (undated) DEVICE — SEALANT HEMOSTAT W/THROM 8ML -- SURGIFLO MATRIX

## (undated) DEVICE — 3.0MM PRECISION NEURO (MATCH HEAD)

## (undated) DEVICE — SOLUTION LACTATED RINGERS INJECTION USP

## (undated) DEVICE — Device

## (undated) DEVICE — KIT EVAC 0.13IN RECT TB DIA10FR 400CC PVC 3 SPR Y CONN DRN

## (undated) DEVICE — SUTURE ABSORBABLE BRAIDED 1-0 OS-8 CR 3X18 IN UD VICRYL J757T

## (undated) DEVICE — HANDPIECE SET WITH HIGH FLOW TIP AND SUCTION TUBE: Brand: INTERPULSE

## (undated) DEVICE — TOTAL TRAY, DB, 100% SILI FOLEY, 16FR 10: Brand: MEDLINE

## (undated) DEVICE — Z DISCONTINUED USE (MFG CAT 7984-37) SOLUTION IV SODIUM CHL 0.9% 100 ML INJ

## (undated) DEVICE — PACK PROCEDURE SURG LAMINECTOMY SPINE CUST